# Patient Record
Sex: FEMALE | Race: WHITE | NOT HISPANIC OR LATINO | URBAN - METROPOLITAN AREA
[De-identification: names, ages, dates, MRNs, and addresses within clinical notes are randomized per-mention and may not be internally consistent; named-entity substitution may affect disease eponyms.]

---

## 2020-04-22 ENCOUNTER — OFFICE (OUTPATIENT)
Dept: URBAN - METROPOLITAN AREA CLINIC 102 | Facility: CLINIC | Age: 80
End: 2020-04-22
Payer: COMMERCIAL

## 2020-04-22 VITALS — HEIGHT: 63 IN | WEIGHT: 134 LBS

## 2020-04-22 DIAGNOSIS — E11.9 TYPE 2 DIABETES MELLITUS WITHOUT COMPLICATIONS: ICD-10-CM

## 2020-04-22 DIAGNOSIS — R19.7 DIARRHEA, UNSPECIFIED: ICD-10-CM

## 2020-04-22 PROCEDURE — 99204 OFFICE O/P NEW MOD 45 MIN: CPT | Mod: 95 | Performed by: PHYSICIAN ASSISTANT

## 2020-04-22 RX ORDER — LACTOBACIL 2/BIFIDO 1/S.THERMO 450B CELL
PACKET (EA) ORAL
Qty: 60 | Refills: 3 | Status: ACTIVE
Start: 2020-04-22

## 2020-04-22 RX ORDER — CHOLESTYRAMINE
POWDER (GRAM) MISCELLANEOUS
Qty: 1 | Refills: 0 | Status: COMPLETED
Start: 2020-04-22 | End: 2020-05-20 | Stop reason: SDUPTHER

## 2020-04-22 NOTE — SERVICEHPINOTES
PATIENT VERIFIED BY DATE OF BIRTH AND NAME. Patient has been consented for this telecommunication visit. Reviewed PmHx, FmHx, SHx. Ms. Hoffman is a 69 yo white female with h/o DMT2, HTN, colonic polyps that presents for new patient visit concerning chronic diarrhea that began in January that has become no worse/better overtime. She states no change in diet/medications, travel, illness or sick contacts that may have triggered her diarrhea. She states intermittent, episodes of non-bloody diarrhea manifesting in the mornings that begin after breakfast. She was initially seen by PCP for this diarrhea that checked stool studies including C diff that was negative and advised empiric course of abx Vancomycin x 10 days that made no difference to her diarrhea per patient report. She has 3-4 loose BMs in the mornings with some fecal urgency: No fecal incontinence or mucous/blood in stool.  Prior colonoscopy over 5 yrs ago or longer that removed a few polyps. Review of organ systems negative for all non GI complaints. She note well control of her DMT2 On Tradjenta 5 mg qd for several months. Denies fevers, n/v, heartburn, abdominal pain, bloating, constipation, rectal bleeding, melena, weight loss. BR

## 2020-05-13 ENCOUNTER — OFFICE (OUTPATIENT)
Dept: URBAN - METROPOLITAN AREA TELEHEALTH 3 | Facility: TELEHEALTH | Age: 80
End: 2020-05-13
Payer: COMMERCIAL

## 2020-05-13 VITALS — WEIGHT: 135 LBS | HEIGHT: 63 IN

## 2020-05-13 DIAGNOSIS — E11.9 TYPE 2 DIABETES MELLITUS WITHOUT COMPLICATIONS: ICD-10-CM

## 2020-05-13 DIAGNOSIS — R19.7 DIARRHEA, UNSPECIFIED: ICD-10-CM

## 2020-05-13 PROCEDURE — 99214 OFFICE O/P EST MOD 30 MIN: CPT | Mod: 95 | Performed by: PHYSICIAN ASSISTANT

## 2020-05-13 RX ORDER — CHOLESTYRAMINE
POWDER (GRAM) MISCELLANEOUS
Qty: 1 | Refills: 3 | Status: COMPLETED
Start: 2020-05-13 | End: 2020-05-21

## 2020-05-13 RX ORDER — LACTOBACIL 2/BIFIDO 1/S.THERMO 450B CELL
PACKET (EA) ORAL
Qty: 60 | Refills: 3 | Status: ACTIVE
Start: 2020-04-22

## 2020-05-13 NOTE — SERVICEHPINOTES
PATIENT VERIFIED BY DATE OF BIRTH AND NAME. Patient has been consented for this telecommunication visit. Reviewed PmHx, FmHx, SHx. Ms. Hoffman is a 71 yo white female with h/o DMT2, HTN, colonic polyps that presents for f/u visit concerning chronic diarrhea that began in January that has become no worse/better overtime. She does not recall change in diet/medications, travel, illness or sick contacts that may have triggered her diarrhea. She states intermittent, episodes of non-bloody diarrhea manifesting in the mornings that begin after breakfast. She was initially seen by PCP for this diarrhea that checked stool studies including C diff that was negative and advised empiric course of abx Vancomycin x 10 days that made no difference to her diarrhea per patient report. She was last seen by our office on 04/22/2020 for this chronic diarrhea concern, she was given probiotic and bile salt binding agent. She has been taking the Visbiome 2 pill qd along with the Cholestyramine 1 scoop every morning around 7 am. Her first BM of the day is around 5 am "loose/mushy" and then no more BMs after 12 pm noon. No recent trail of taking Cholestyramine before her first BM of the day. Prior colonoscopy over 5 yrs ago or longer that removed a few polyps per patient report. Review of organ systems negative for all non GI complaints. She notes well control of her DMT2 On Tradjenta 5 mg qd for several months. Denies fevers, n/v, heartburn, abdominal pain, bloating, constipation, rectal bleeding, blood in stool, melena, weight loss. No other complaints.BR